# Patient Record
Sex: FEMALE | Race: WHITE | Employment: FULL TIME | URBAN - METROPOLITAN AREA
[De-identification: names, ages, dates, MRNs, and addresses within clinical notes are randomized per-mention and may not be internally consistent; named-entity substitution may affect disease eponyms.]

---

## 2017-02-10 PROCEDURE — 86780 TREPONEMA PALLIDUM: CPT | Performed by: OBSTETRICS & GYNECOLOGY

## 2017-02-10 PROCEDURE — 86850 RBC ANTIBODY SCREEN: CPT | Performed by: OBSTETRICS & GYNECOLOGY

## 2017-02-10 PROCEDURE — 82950 GLUCOSE TEST: CPT | Performed by: OBSTETRICS & GYNECOLOGY

## 2017-04-07 PROCEDURE — 87081 CULTURE SCREEN ONLY: CPT | Performed by: OBSTETRICS & GYNECOLOGY

## 2017-05-02 ENCOUNTER — TELEPHONE (OUTPATIENT)
Dept: OBGYN UNIT | Facility: HOSPITAL | Age: 32
End: 2017-05-02

## 2017-05-03 ENCOUNTER — HOSPITAL ENCOUNTER (INPATIENT)
Facility: HOSPITAL | Age: 32
LOS: 3 days | Discharge: HOME | End: 2017-05-06
Attending: OBSTETRICS & GYNECOLOGY | Admitting: OBSTETRICS & GYNECOLOGY
Payer: COMMERCIAL

## 2017-05-03 ENCOUNTER — SURGERY (OUTPATIENT)
Age: 32
End: 2017-05-03

## 2017-05-03 PROBLEM — Z34.90 PREGNANT: Status: ACTIVE | Noted: 2017-05-03

## 2017-05-03 PROBLEM — Z34.90 PREGNANT (HCC): Status: ACTIVE | Noted: 2017-05-03

## 2017-05-03 PROCEDURE — 59514 CESAREAN DELIVERY ONLY: CPT | Performed by: OBSTETRICS & GYNECOLOGY

## 2017-05-03 RX ORDER — DEXTROSE, SODIUM CHLORIDE, SODIUM LACTATE, POTASSIUM CHLORIDE, AND CALCIUM CHLORIDE 5; .6; .31; .03; .02 G/100ML; G/100ML; G/100ML; G/100ML; G/100ML
INJECTION, SOLUTION INTRAVENOUS AS NEEDED
Status: DISCONTINUED | OUTPATIENT
Start: 2017-05-03 | End: 2017-05-03

## 2017-05-03 RX ORDER — HYDROMORPHONE HYDROCHLORIDE 1 MG/ML
0.4 INJECTION, SOLUTION INTRAMUSCULAR; INTRAVENOUS; SUBCUTANEOUS EVERY 2 HOUR PRN
Status: DISPENSED | OUTPATIENT
Start: 2017-05-03 | End: 2017-05-04

## 2017-05-03 RX ORDER — HYDROCODONE BITARTRATE AND ACETAMINOPHEN 5; 325 MG/1; MG/1
1 TABLET ORAL EVERY 4 HOURS PRN
Status: DISCONTINUED | OUTPATIENT
Start: 2017-05-03 | End: 2017-05-06

## 2017-05-03 RX ORDER — ONDANSETRON 2 MG/ML
4 INJECTION INTRAMUSCULAR; INTRAVENOUS EVERY 6 HOURS PRN
Status: DISCONTINUED | OUTPATIENT
Start: 2017-05-03 | End: 2017-05-06

## 2017-05-03 RX ORDER — TERBUTALINE SULFATE 1 MG/ML
0.25 INJECTION, SOLUTION SUBCUTANEOUS AS NEEDED
Status: DISCONTINUED | OUTPATIENT
Start: 2017-05-03 | End: 2017-05-03

## 2017-05-03 RX ORDER — ONDANSETRON 2 MG/ML
4 INJECTION INTRAMUSCULAR; INTRAVENOUS EVERY 6 HOURS PRN
Status: DISCONTINUED | OUTPATIENT
Start: 2017-05-03 | End: 2017-05-03

## 2017-05-03 RX ORDER — NALBUPHINE HCL 10 MG/ML
2.5 AMPUL (ML) INJECTION
Status: DISCONTINUED | OUTPATIENT
Start: 2017-05-03 | End: 2017-05-06

## 2017-05-03 RX ORDER — SODIUM CHLORIDE, SODIUM LACTATE, POTASSIUM CHLORIDE, CALCIUM CHLORIDE 600; 310; 30; 20 MG/100ML; MG/100ML; MG/100ML; MG/100ML
INJECTION, SOLUTION INTRAVENOUS CONTINUOUS
Status: DISCONTINUED | OUTPATIENT
Start: 2017-05-03 | End: 2017-05-06

## 2017-05-03 RX ORDER — KETOROLAC TROMETHAMINE 30 MG/ML
30 INJECTION, SOLUTION INTRAMUSCULAR; INTRAVENOUS EVERY 6 HOURS PRN
Status: DISPENSED | OUTPATIENT
Start: 2017-05-03 | End: 2017-05-04

## 2017-05-03 RX ORDER — HYDROMORPHONE HYDROCHLORIDE 1 MG/ML
0.4 INJECTION, SOLUTION INTRAMUSCULAR; INTRAVENOUS; SUBCUTANEOUS EVERY 5 MIN PRN
Status: DISCONTINUED | OUTPATIENT
Start: 2017-05-03 | End: 2017-05-04 | Stop reason: HOSPADM

## 2017-05-03 RX ORDER — CEFAZOLIN SODIUM 1 G/3ML
INJECTION, POWDER, FOR SOLUTION INTRAMUSCULAR; INTRAVENOUS
Status: DISCONTINUED | OUTPATIENT
Start: 2017-05-03 | End: 2017-05-03

## 2017-05-03 RX ORDER — BISACODYL 10 MG
10 SUPPOSITORY, RECTAL RECTAL
Status: DISCONTINUED | OUTPATIENT
Start: 2017-05-03 | End: 2017-05-06

## 2017-05-03 RX ORDER — ZOLPIDEM TARTRATE 5 MG/1
5 TABLET ORAL NIGHTLY PRN
Status: DISCONTINUED | OUTPATIENT
Start: 2017-05-03 | End: 2017-05-06

## 2017-05-03 RX ORDER — DIPHENHYDRAMINE HYDROCHLORIDE 50 MG/ML
25 INJECTION INTRAMUSCULAR; INTRAVENOUS ONCE AS NEEDED
Status: ACTIVE | OUTPATIENT
Start: 2017-05-03 | End: 2017-05-03

## 2017-05-03 RX ORDER — SIMETHICONE 80 MG
80 TABLET,CHEWABLE ORAL 3 TIMES DAILY PRN
Status: DISCONTINUED | OUTPATIENT
Start: 2017-05-03 | End: 2017-05-06

## 2017-05-03 RX ORDER — NALBUPHINE HCL 10 MG/ML
2.5 AMPUL (ML) INJECTION EVERY 4 HOURS PRN
Status: DISCONTINUED | OUTPATIENT
Start: 2017-05-03 | End: 2017-05-06

## 2017-05-03 RX ORDER — KETOROLAC TROMETHAMINE 30 MG/ML
30 INJECTION, SOLUTION INTRAMUSCULAR; INTRAVENOUS ONCE AS NEEDED
Status: COMPLETED | OUTPATIENT
Start: 2017-05-03 | End: 2017-05-03

## 2017-05-03 RX ORDER — SODIUM CHLORIDE, SODIUM LACTATE, POTASSIUM CHLORIDE, CALCIUM CHLORIDE 600; 310; 30; 20 MG/100ML; MG/100ML; MG/100ML; MG/100ML
INJECTION, SOLUTION INTRAVENOUS CONTINUOUS
Status: DISCONTINUED | OUTPATIENT
Start: 2017-05-03 | End: 2017-05-03

## 2017-05-03 RX ORDER — KETOROLAC TROMETHAMINE 30 MG/ML
INJECTION, SOLUTION INTRAMUSCULAR; INTRAVENOUS
Status: DISPENSED
Start: 2017-05-03 | End: 2017-05-04

## 2017-05-03 RX ORDER — EPHEDRINE SULFATE 50 MG/ML
5 INJECTION, SOLUTION INTRAVENOUS AS NEEDED
Status: DISCONTINUED | OUTPATIENT
Start: 2017-05-03 | End: 2017-05-03

## 2017-05-03 RX ORDER — ONDANSETRON 2 MG/ML
4 INJECTION INTRAMUSCULAR; INTRAVENOUS ONCE AS NEEDED
Status: ACTIVE | OUTPATIENT
Start: 2017-05-03 | End: 2017-05-03

## 2017-05-03 RX ORDER — IBUPROFEN 600 MG/1
600 TABLET ORAL ONCE AS NEEDED
Status: DISCONTINUED | OUTPATIENT
Start: 2017-05-03 | End: 2017-05-03

## 2017-05-03 RX ORDER — DOCUSATE SODIUM 100 MG/1
100 CAPSULE, LIQUID FILLED ORAL
Status: DISCONTINUED | OUTPATIENT
Start: 2017-05-04 | End: 2017-05-06

## 2017-05-03 RX ORDER — DIPHENHYDRAMINE HCL 25 MG
25 CAPSULE ORAL EVERY 4 HOURS PRN
Status: DISCONTINUED | OUTPATIENT
Start: 2017-05-03 | End: 2017-05-06

## 2017-05-03 RX ORDER — IBUPROFEN 600 MG/1
600 TABLET ORAL EVERY 6 HOURS SCHEDULED
Status: DISCONTINUED | OUTPATIENT
Start: 2017-05-04 | End: 2017-05-06

## 2017-05-03 RX ORDER — DIPHENHYDRAMINE HYDROCHLORIDE 50 MG/ML
12.5 INJECTION INTRAMUSCULAR; INTRAVENOUS EVERY 4 HOURS PRN
Status: DISCONTINUED | OUTPATIENT
Start: 2017-05-03 | End: 2017-05-06

## 2017-05-03 RX ORDER — HYDROCODONE BITARTRATE AND ACETAMINOPHEN 10; 325 MG/1; MG/1
1 TABLET ORAL EVERY 4 HOURS PRN
Status: DISCONTINUED | OUTPATIENT
Start: 2017-05-03 | End: 2017-05-06

## 2017-05-03 RX ORDER — NALOXONE HYDROCHLORIDE 0.4 MG/ML
0.08 INJECTION, SOLUTION INTRAMUSCULAR; INTRAVENOUS; SUBCUTANEOUS
Status: ACTIVE | OUTPATIENT
Start: 2017-05-03 | End: 2017-05-04

## 2017-05-03 RX ORDER — NALBUPHINE HCL 10 MG/ML
2.5 AMPUL (ML) INJECTION
Status: DISCONTINUED | OUTPATIENT
Start: 2017-05-03 | End: 2017-05-04 | Stop reason: HOSPADM

## 2017-05-03 RX ORDER — DEXTROSE, SODIUM CHLORIDE, SODIUM LACTATE, POTASSIUM CHLORIDE, AND CALCIUM CHLORIDE 5; .6; .31; .03; .02 G/100ML; G/100ML; G/100ML; G/100ML; G/100ML
INJECTION, SOLUTION INTRAVENOUS CONTINUOUS
Status: DISCONTINUED | OUTPATIENT
Start: 2017-05-04 | End: 2017-05-06

## 2017-05-03 NOTE — PROGRESS NOTES
Labor Progress Note    Contractions tolerable. On birthing ball.    Temp:  [97.7 °F (36.5 °C)-98.1 °F (36.7 °C)] 98 °F (36.7 °C)  Pulse:  [71-84] 84  Resp:  [18-20] 20  BP: (116-126)/(79-87) 119/79 mmHg  FHT:  baseline 130s, moderate variability, accels natividad

## 2017-05-03 NOTE — PROGRESS NOTES
Pt is a 28year old female admitted to -A. Patient presents with:  R/o Rom: trickle around 0200 and now steady coming out     Pt is  39w5d intra-uterine pregnancy. History obtained, consents signed.  Oriented to room, staff, and plan of car

## 2017-05-03 NOTE — PROGRESS NOTES
[facility]  Labor Progress Note      SUBJECTIVE:    Interval History: none. .    C/o painfurl contractions    OBJECTIVE:    Vital signs in last 24 hours:       Fetal Surveillance:  Fetal heart variability: moderate    Cervix:  Dilation:3cm  Effacement:

## 2017-05-03 NOTE — H&P
[address] and Delivery Prenatal History and Physical Interval Addendum  Please see full Prenatal Record for this pregnancy      SUBJECTIVE:    Interval History:      This is a pregnancy at 44 5/7 weeks admitted for labor    OBJECTIVE:    The pa

## 2017-05-04 PROCEDURE — 3E0234Z INTRODUCTION OF SERUM, TOXOID AND VACCINE INTO MUSCLE, PERCUTANEOUS APPROACH: ICD-10-PCS | Performed by: OBSTETRICS & GYNECOLOGY

## 2017-05-04 NOTE — PROGRESS NOTES
[facility]    Patients Name: Halina Graft  Attending Physician: Jami Garcia MD  CSN: [id number]    Location:  0814/8878-I  MRN: [id number]    YOB: 1985  Admission Date: 5/3/2017     Obstetric Anesthesia Pain Progress Note    Post-Op D

## 2017-05-04 NOTE — OPERATIVE REPORT
PREOPERATIVE DIAGNOSIS:   1. 39 week pregnancy  2. Arrest of dilation in labor    POSTOPERATIVE DIAGNOSIS:   same    PROCEDURE PERFORMED: primary low transverse  section.      SURGEON: Theresa Horner MD    ASSISTANT: Carrie Ortiz MD    ANESTHESIA: hemostatic. 2-0 plain gut used to reapproximate the subcutaneous fat. Skin was closed with 4-0 Vicryl   in a subcuticular fashion. Mastisol and Steri-Strips were applied. All sponge, needle, and instrument counts were correct.  Estimated   blood loss was

## 2017-05-04 NOTE — PLAN OF CARE
GASTROINTESTINAL - ADULT    • Minimal or absence of nausea and vomiting Completed          GASTROINTESTINAL - ADULT    • Maintains or returns to baseline bowel function Progressing    • Maintains adequate nutritional intake (undernourished) Progressing

## 2017-05-04 NOTE — PROGRESS NOTES
At 1830, cx was 5/100/-1  Now same. Also, some caput and cervical edema noted  Pelvis narrow  Discussed possible arrest of dilation. Will reexamine shortly for progress.

## 2017-05-04 NOTE — PROGRESS NOTES
S: pt without complaints.   no flatus  O: VS-Temp:  [97.7 °F (36.5 °C)-99.1 °F (37.3 °C)] 97.7 °F (36.5 °C)  Pulse:  [] 89  Resp:  [11-30] 20  BP: ()/() 116/75 mmHg       I/O last 24 hours-  Intake/Output Summary (Last 24 hours) at 05/04/1

## 2017-05-04 NOTE — PROGRESS NOTES
Comfortable with epidural anesthesia. cx unchanged, edematous. Blood tinged urine in bladder. Caput   Arrest of dilation: will proceed w/. Pt consents.

## 2017-05-04 NOTE — PROGRESS NOTES
Report received from [facility]. Patient resting comfortably in bed. POC discussed with patient. All questions answered. Patient verbalized understanding. Call light within reach.

## 2017-05-04 NOTE — PROGRESS NOTES
Pt transferred  to Mother Baby room 2190 in stable condition. Report given to Missy Mierles [place]. Infant transferred with mother in stable condition.

## 2017-05-05 NOTE — PROGRESS NOTES
S: pt without complaints.   Positive flatus  O: VS-Temp:  [98 °F (36.7 °C)-98.7 °F (37.1 °C)] 98 °F (36.7 °C)  Pulse:  [73-92] 73  Resp:  [16-17] 17  BP: (111-124)/(71-78) 111/78 mmHg       Abdomen: soft, ND, NT  Incision- CDI       Extremities: 1+ edema, N

## 2017-05-05 NOTE — PLAN OF CARE
GASTROINTESTINAL - ADULT    • Maintains or returns to baseline bowel function Completed    • Maintains adequate nutritional intake (undernourished) Completed    • Achieves appropriate nutritional intake (bariatric) Completed          BREAST FEEDING    • Op

## 2017-05-05 NOTE — PLAN OF CARE
Problem: INADEQUATE LATCH, SUCK OR SWALLOW  Goal: Demonstrate ability to latch and sustain latch, audible swallowing and satiety  INTERVENTIONS:  - Assess oral anatomy, notify LIP for abnormal findings  - Hand expression  - Maximize feeding opportunity (sk

## 2017-05-06 VITALS
TEMPERATURE: 98 F | HEIGHT: 64 IN | SYSTOLIC BLOOD PRESSURE: 105 MMHG | HEART RATE: 76 BPM | WEIGHT: 168 LBS | DIASTOLIC BLOOD PRESSURE: 69 MMHG | BODY MASS INDEX: 28.68 KG/M2 | RESPIRATION RATE: 16 BRPM | OXYGEN SATURATION: 100 %

## 2017-05-06 RX ORDER — HYDROCODONE BITARTRATE AND ACETAMINOPHEN 5; 325 MG/1; MG/1
1 TABLET ORAL EVERY 8 HOURS PRN
Qty: 42 TABLET | Refills: 0 | Status: SHIPPED | OUTPATIENT
Start: 2017-05-06 | End: 2017-06-16 | Stop reason: ALTCHOICE

## 2017-05-06 NOTE — DISCHARGE SUMMARY
[facility]  Discharge Summary    Kishan Kee Patient Status:  Inpatient    1985 MRN [id number]   [facility] 2SW-J Attending Ashley Alejandre MD   Hosp Day # 3 PCP Monie Bolanos     Date of Admission: 5/3/2017    Date of Dischar

## 2017-05-06 NOTE — PLAN OF CARE
Problem: POSTPARTUM  Goal: Long Term Goal:Experiences normal postpartum course  INTERVENTIONS:  - Assess and monitor vital signs and lab values. - Assess fundus and lochia. - Provide ice/sitz baths for perineum discomfort.   - Monitor healing of incision/ provide assistance with proper latch. - Review techniques for milk expression (breast pumping) and storage of breast milk. Provide pumping equipment/supplies, instructions and assistance, as needed. - Encourage rooming-in and breast feeding on demand.   - techniques for milk expression (breast pumping) and storage of breast milk. Provide pumping equipment/supplies, instructions and assistance, as needed. - Encourage rooming-in and breast feeding on demand.  - Encourage skin-to-skin contact.   - Provide LC s

## 2017-05-06 NOTE — PROGRESS NOTES
Patient in stable condition. Discharge instructions given. ID bands verified. Hugs and Kisses tags removed. Per infant safety seat to auto by staff carried by father. Mother taken by wheel chair.

## 2017-05-06 NOTE — PROGRESS NOTES
[facility]  Post-Partum Caesarean Section Progress Note    Branden Chen Patient Status:  Inpatient    1985 MRN [id number]   [facility] 2SW-J Attending Mandeep Tim MD   Hosp Day # 3 PCP HEIDI Jose     SUBJECTIVE:    Postp

## 2017-05-08 ENCOUNTER — TELEPHONE (OUTPATIENT)
Dept: OBGYN UNIT | Facility: HOSPITAL | Age: 32
End: 2017-05-08

## 2017-05-09 ENCOUNTER — NURSE ONLY (OUTPATIENT)
Dept: LACTATION | Facility: HOSPITAL | Age: 32
End: 2017-05-09
Attending: OBSTETRICS & GYNECOLOGY
Payer: COMMERCIAL

## 2017-05-09 VITALS — TEMPERATURE: 98 F

## 2017-05-09 DIAGNOSIS — O92.79 ENGORGEMENT OF BREASTS, POSTPARTUM CONDITION OR COMPLICATION: Primary | ICD-10-CM

## 2017-05-09 PROCEDURE — 99213 OFFICE O/P EST LOW 20 MIN: CPT

## 2017-05-09 NOTE — PATIENT INSTRUCTIONS
Today's weight: 7 lb 14.2 oz in diaper only  Intake from breast with nipple shield: 8 mm    Based on observations made at today's visit, the following recommendations are made:    Use the nipple shield as needed to help Jeri Hill latch to the breast. If need uninterested in nursing. Apply cold compresses for 10 minutes on your breast if needed to decrease breast swelling after nursing or pumping:     Rest and drink plenty of fluids.     Check with your doctor about taking ibuprofen for relief of swelling and

## 2017-05-11 ENCOUNTER — TELEPHONE (OUTPATIENT)
Dept: OBGYN UNIT | Facility: HOSPITAL | Age: 32
End: 2017-05-11

## 2017-05-12 ENCOUNTER — TELEPHONE (OUTPATIENT)
Dept: OBGYN UNIT | Facility: HOSPITAL | Age: 32
End: 2017-05-12

## 2017-05-12 NOTE — PROGRESS NOTES
Outgoing cradle call completed. Mom reports that she and infant are doing well. No complaints of PPB or PPD. Has had pediatrician F/U visit. Had appt with LC and has a F/U on Monday. Has PP F/U visit scheduled for OB next Thursday.  Reviewed basic infant a

## 2017-05-15 ENCOUNTER — NURSE ONLY (OUTPATIENT)
Dept: LACTATION | Facility: HOSPITAL | Age: 32
End: 2017-05-15
Attending: OBSTETRICS & GYNECOLOGY
Payer: COMMERCIAL

## 2017-05-15 PROCEDURE — 99212 OFFICE O/P EST SF 10 MIN: CPT

## 2017-05-15 NOTE — PATIENT INSTRUCTIONS
Today's weight: diaper only 3750 grams = 8 lb 4.3 oz. Intake at right breast only: 1st 10-15 minutes 24 ml, went back to breast after diaper change for about 25 minutes for 70 ml  Total feedin ml which is very appropriate for his weight and age.     K

## 2017-06-03 ENCOUNTER — NURSE ONLY (OUTPATIENT)
Dept: LACTATION | Facility: HOSPITAL | Age: 32
End: 2017-06-03
Attending: OBSTETRICS & GYNECOLOGY
Payer: COMMERCIAL

## 2017-06-03 PROCEDURE — 99212 OFFICE O/P EST SF 10 MIN: CPT

## 2017-06-16 PROCEDURE — 88175 CYTOPATH C/V AUTO FLUID REDO: CPT | Performed by: OBSTETRICS & GYNECOLOGY

## 2017-06-16 PROCEDURE — 87624 HPV HI-RISK TYP POOLED RSLT: CPT | Performed by: OBSTETRICS & GYNECOLOGY

## 2018-07-27 PROBLEM — Z34.90 PREGNANT: Status: RESOLVED | Noted: 2017-05-03 | Resolved: 2018-07-27

## 2018-07-27 PROBLEM — O34.219 PREVIOUS CESAREAN DELIVERY AFFECTING PREGNANCY (HCC): Status: ACTIVE | Noted: 2018-07-27

## 2018-07-27 PROBLEM — Z34.80 SUPERVISION OF OTHER NORMAL PREGNANCY, ANTEPARTUM (HCC): Status: ACTIVE | Noted: 2018-07-27

## 2018-07-27 PROBLEM — Z34.90 PREGNANT (HCC): Status: RESOLVED | Noted: 2017-05-03 | Resolved: 2018-07-27

## 2018-07-27 PROBLEM — O34.219 PREVIOUS CESAREAN DELIVERY AFFECTING PREGNANCY: Status: ACTIVE | Noted: 2018-07-27

## 2018-07-27 PROBLEM — Z34.80 SUPERVISION OF OTHER NORMAL PREGNANCY, ANTEPARTUM: Status: ACTIVE | Noted: 2018-07-27

## 2018-07-31 PROCEDURE — 87340 HEPATITIS B SURFACE AG IA: CPT | Performed by: OBSTETRICS & GYNECOLOGY

## 2018-07-31 PROCEDURE — 86762 RUBELLA ANTIBODY: CPT | Performed by: OBSTETRICS & GYNECOLOGY

## 2018-07-31 PROCEDURE — 86850 RBC ANTIBODY SCREEN: CPT | Performed by: OBSTETRICS & GYNECOLOGY

## 2018-07-31 PROCEDURE — 86900 BLOOD TYPING SEROLOGIC ABO: CPT | Performed by: OBSTETRICS & GYNECOLOGY

## 2018-07-31 PROCEDURE — 86780 TREPONEMA PALLIDUM: CPT | Performed by: OBSTETRICS & GYNECOLOGY

## 2018-07-31 PROCEDURE — 87389 HIV-1 AG W/HIV-1&-2 AB AG IA: CPT | Performed by: OBSTETRICS & GYNECOLOGY

## 2018-07-31 PROCEDURE — 86901 BLOOD TYPING SEROLOGIC RH(D): CPT | Performed by: OBSTETRICS & GYNECOLOGY

## 2018-08-15 PROBLEM — O26.899 RH NEGATIVE STATE IN ANTEPARTUM PERIOD: Status: ACTIVE | Noted: 2018-08-15

## 2018-08-15 PROBLEM — Z67.91 RH NEGATIVE STATE IN ANTEPARTUM PERIOD: Status: ACTIVE | Noted: 2018-08-15

## 2018-08-15 PROBLEM — Z67.91 RH NEGATIVE STATE IN ANTEPARTUM PERIOD (HCC): Status: ACTIVE | Noted: 2018-08-15

## 2018-08-15 PROBLEM — O26.899 RH NEGATIVE STATE IN ANTEPARTUM PERIOD (HCC): Status: ACTIVE | Noted: 2018-08-15

## 2018-08-24 PROCEDURE — 87086 URINE CULTURE/COLONY COUNT: CPT | Performed by: OBSTETRICS & GYNECOLOGY

## 2018-12-10 PROCEDURE — 87389 HIV-1 AG W/HIV-1&-2 AB AG IA: CPT | Performed by: OBSTETRICS & GYNECOLOGY

## 2018-12-10 PROCEDURE — 86850 RBC ANTIBODY SCREEN: CPT | Performed by: OBSTETRICS & GYNECOLOGY

## 2018-12-10 PROCEDURE — 86780 TREPONEMA PALLIDUM: CPT | Performed by: OBSTETRICS & GYNECOLOGY

## 2018-12-20 PROBLEM — O24.410 GDM (GESTATIONAL DIABETES MELLITUS), CLASS A1: Status: ACTIVE | Noted: 2018-12-20

## 2018-12-20 PROBLEM — O24.410 GDM (GESTATIONAL DIABETES MELLITUS), CLASS A1 (HCC): Status: ACTIVE | Noted: 2018-12-20

## 2019-02-27 ENCOUNTER — TELEPHONE (OUTPATIENT)
Dept: OBGYN UNIT | Facility: HOSPITAL | Age: 34
End: 2019-02-27

## 2019-03-01 ENCOUNTER — TELEPHONE (OUTPATIENT)
Dept: OBGYN UNIT | Facility: HOSPITAL | Age: 34
End: 2019-03-01

## 2019-03-03 NOTE — H&P
[address] and Delivery Prenatal History and Physical Interval Addendum  Please see full Prenatal Record for this pregnancy      SUBJECTIVE:    Interval History: This is a pregnancy at 39 weeks admitted for repeat  delivery.   [place]

## 2019-03-05 ENCOUNTER — ANESTHESIA (OUTPATIENT)
Dept: OBGYN UNIT | Facility: HOSPITAL | Age: 34
End: 2019-03-05
Payer: COMMERCIAL

## 2019-03-05 ENCOUNTER — ANESTHESIA EVENT (OUTPATIENT)
Dept: OBGYN UNIT | Facility: HOSPITAL | Age: 34
End: 2019-03-05
Payer: COMMERCIAL

## 2019-03-05 ENCOUNTER — HOSPITAL ENCOUNTER (INPATIENT)
Facility: HOSPITAL | Age: 34
LOS: 2 days | Discharge: HOME | End: 2019-03-07
Attending: OBSTETRICS & GYNECOLOGY | Admitting: OBSTETRICS & GYNECOLOGY
Payer: COMMERCIAL

## 2019-03-05 PROBLEM — Z34.90 PREGNANCY (HCC): Status: ACTIVE | Noted: 2019-03-05

## 2019-03-05 PROBLEM — Z34.90 PREGNANCY: Status: ACTIVE | Noted: 2019-03-05

## 2019-03-05 LAB
ANTIBODY SCREEN: POSITIVE
BASOPHILS # BLD AUTO: 0.03 X10(3) UL (ref 0–0.2)
BASOPHILS NFR BLD AUTO: 0.5 %
DEPRECATED RDW RBC AUTO: 46.2 FL (ref 35.1–46.3)
EOSINOPHIL # BLD AUTO: 0.31 X10(3) UL (ref 0–0.7)
EOSINOPHIL NFR BLD AUTO: 4.9 %
ERYTHROCYTE [DISTWIDTH] IN BLOOD BY AUTOMATED COUNT: 13.7 % (ref 11–15)
FETAL SCREEN RESULT: NEGATIVE
GLUCOSE BLD-MCNC: 81 MG/DL (ref 70–99)
HCT VFR BLD AUTO: 35.9 % (ref 35–48)
HGB BLD-MCNC: 12.8 G/DL (ref 12–16)
IMM GRANULOCYTES # BLD AUTO: 0.08 X10(3) UL (ref 0–1)
IMM GRANULOCYTES NFR BLD: 1.3 %
LYMPHOCYTES # BLD AUTO: 1.85 X10(3) UL (ref 1–4)
LYMPHOCYTES NFR BLD AUTO: 29.2 %
MCH RBC QN AUTO: 33.3 PG (ref 26–34)
MCHC RBC AUTO-ENTMCNC: 35.7 G/DL (ref 31–37)
MCV RBC AUTO: 93.5 FL (ref 80–100)
MONOCYTES # BLD AUTO: 0.53 X10(3) UL (ref 0.1–1)
MONOCYTES NFR BLD AUTO: 8.4 %
NEUTROPHILS # BLD AUTO: 3.53 X10 (3) UL (ref 1.5–7.7)
NEUTROPHILS # BLD AUTO: 3.53 X10(3) UL (ref 1.5–7.7)
NEUTROPHILS NFR BLD AUTO: 55.7 %
PLATELET # BLD AUTO: 117 10(3)UL (ref 150–450)
RBC # BLD AUTO: 3.84 X10(6)UL (ref 3.8–5.3)
RH BLOOD TYPE: NEGATIVE
T PALLIDUM AB SER QL IA: NONREACTIVE
WBC # BLD AUTO: 6.3 X10(3) UL (ref 4–11)

## 2019-03-05 PROCEDURE — 85025 COMPLETE CBC W/AUTO DIFF WBC: CPT | Performed by: OBSTETRICS & GYNECOLOGY

## 2019-03-05 PROCEDURE — 85460 HEMOGLOBIN FETAL: CPT | Performed by: OBSTETRICS & GYNECOLOGY

## 2019-03-05 PROCEDURE — 3E0334Z INTRODUCTION OF SERUM, TOXOID AND VACCINE INTO PERIPHERAL VEIN, PERCUTANEOUS APPROACH: ICD-10-PCS | Performed by: OBSTETRICS & GYNECOLOGY

## 2019-03-05 PROCEDURE — 86870 RBC ANTIBODY IDENTIFICATION: CPT | Performed by: OBSTETRICS & GYNECOLOGY

## 2019-03-05 PROCEDURE — 85461 HEMOGLOBIN FETAL: CPT | Performed by: OBSTETRICS & GYNECOLOGY

## 2019-03-05 PROCEDURE — S0028 INJECTION, FAMOTIDINE, 20 MG: HCPCS | Performed by: OBSTETRICS & GYNECOLOGY

## 2019-03-05 PROCEDURE — 86780 TREPONEMA PALLIDUM: CPT | Performed by: OBSTETRICS & GYNECOLOGY

## 2019-03-05 PROCEDURE — 86901 BLOOD TYPING SEROLOGIC RH(D): CPT | Performed by: OBSTETRICS & GYNECOLOGY

## 2019-03-05 PROCEDURE — 82947 ASSAY GLUCOSE BLOOD QUANT: CPT | Performed by: OBSTETRICS & GYNECOLOGY

## 2019-03-05 PROCEDURE — 86900 BLOOD TYPING SEROLOGIC ABO: CPT | Performed by: OBSTETRICS & GYNECOLOGY

## 2019-03-05 PROCEDURE — 86850 RBC ANTIBODY SCREEN: CPT | Performed by: OBSTETRICS & GYNECOLOGY

## 2019-03-05 RX ORDER — HYDROCODONE BITARTRATE AND ACETAMINOPHEN 10; 325 MG/1; MG/1
1 TABLET ORAL EVERY 4 HOURS PRN
Status: DISCONTINUED | OUTPATIENT
Start: 2019-03-05 | End: 2019-03-07

## 2019-03-05 RX ORDER — DOCUSATE SODIUM 100 MG/1
100 CAPSULE, LIQUID FILLED ORAL
Status: DISCONTINUED | OUTPATIENT
Start: 2019-03-06 | End: 2019-03-07

## 2019-03-05 RX ORDER — KETOROLAC TROMETHAMINE 30 MG/ML
INJECTION, SOLUTION INTRAMUSCULAR; INTRAVENOUS
Status: COMPLETED
Start: 2019-03-05 | End: 2019-03-05

## 2019-03-05 RX ORDER — KETOROLAC TROMETHAMINE 30 MG/ML
30 INJECTION, SOLUTION INTRAMUSCULAR; INTRAVENOUS ONCE AS NEEDED
Status: COMPLETED | OUTPATIENT
Start: 2019-03-05 | End: 2019-03-05

## 2019-03-05 RX ORDER — METOCLOPRAMIDE 10 MG/1
10 TABLET ORAL ONCE
Status: DISCONTINUED | OUTPATIENT
Start: 2019-03-05 | End: 2019-03-05 | Stop reason: HOSPADM

## 2019-03-05 RX ORDER — KETOROLAC TROMETHAMINE 30 MG/ML
30 INJECTION, SOLUTION INTRAMUSCULAR; INTRAVENOUS EVERY 6 HOURS PRN
Status: DISPENSED | OUTPATIENT
Start: 2019-03-05 | End: 2019-03-06

## 2019-03-05 RX ORDER — MORPHINE SULFATE 4 MG/ML
2 INJECTION, SOLUTION INTRAMUSCULAR; INTRAVENOUS EVERY 2 HOUR PRN
Status: ACTIVE | OUTPATIENT
Start: 2019-03-05 | End: 2019-03-06

## 2019-03-05 RX ORDER — DIPHENHYDRAMINE HYDROCHLORIDE 50 MG/ML
25 INJECTION INTRAMUSCULAR; INTRAVENOUS ONCE AS NEEDED
Status: DISCONTINUED | OUTPATIENT
Start: 2019-03-05 | End: 2019-03-05 | Stop reason: HOSPADM

## 2019-03-05 RX ORDER — DIPHENHYDRAMINE HYDROCHLORIDE 50 MG/ML
12.5 INJECTION INTRAMUSCULAR; INTRAVENOUS EVERY 4 HOURS PRN
Status: DISCONTINUED | OUTPATIENT
Start: 2019-03-05 | End: 2019-03-07

## 2019-03-05 RX ORDER — MORPHINE SULFATE 0.5 MG/ML
0.2 INJECTION, SOLUTION EPIDURAL; INTRATHECAL; INTRAVENOUS ONCE
Status: DISCONTINUED | OUTPATIENT
Start: 2019-03-05 | End: 2019-03-07

## 2019-03-05 RX ORDER — NALBUPHINE HCL 10 MG/ML
2.5 AMPUL (ML) INJECTION EVERY 4 HOURS PRN
Status: DISCONTINUED | OUTPATIENT
Start: 2019-03-05 | End: 2019-03-07

## 2019-03-05 RX ORDER — DIPHENHYDRAMINE HCL 25 MG
25 CAPSULE ORAL EVERY 4 HOURS PRN
Status: DISCONTINUED | OUTPATIENT
Start: 2019-03-05 | End: 2019-03-07

## 2019-03-05 RX ORDER — ONDANSETRON 2 MG/ML
4 INJECTION INTRAMUSCULAR; INTRAVENOUS EVERY 6 HOURS PRN
Status: DISCONTINUED | OUTPATIENT
Start: 2019-03-05 | End: 2019-03-07

## 2019-03-05 RX ORDER — FAMOTIDINE 20 MG/1
20 TABLET ORAL ONCE
Status: DISCONTINUED | OUTPATIENT
Start: 2019-03-05 | End: 2019-03-05 | Stop reason: HOSPADM

## 2019-03-05 RX ORDER — DEXTROSE MONOHYDRATE 25 G/50ML
50 INJECTION, SOLUTION INTRAVENOUS
Status: DISCONTINUED | OUTPATIENT
Start: 2019-03-05 | End: 2019-03-05 | Stop reason: HOSPADM

## 2019-03-05 RX ORDER — SODIUM CHLORIDE, SODIUM LACTATE, POTASSIUM CHLORIDE, CALCIUM CHLORIDE 600; 310; 30; 20 MG/100ML; MG/100ML; MG/100ML; MG/100ML
INJECTION, SOLUTION INTRAVENOUS CONTINUOUS
Status: DISCONTINUED | OUTPATIENT
Start: 2019-03-05 | End: 2019-03-07

## 2019-03-05 RX ORDER — CEFAZOLIN SODIUM/WATER 2 G/20 ML
SYRINGE (ML) INTRAVENOUS
Status: DISCONTINUED | OUTPATIENT
Start: 2019-03-05 | End: 2019-03-05 | Stop reason: HOSPADM

## 2019-03-05 RX ORDER — IBUPROFEN 600 MG/1
600 TABLET ORAL EVERY 6 HOURS SCHEDULED
Status: DISCONTINUED | OUTPATIENT
Start: 2019-03-06 | End: 2019-03-07

## 2019-03-05 RX ORDER — CEFAZOLIN SODIUM/WATER 2 G/20 ML
2 SYRINGE (ML) INTRAVENOUS ONCE
Status: DISCONTINUED | OUTPATIENT
Start: 2019-03-05 | End: 2019-03-05 | Stop reason: HOSPADM

## 2019-03-05 RX ORDER — BISACODYL 10 MG
10 SUPPOSITORY, RECTAL RECTAL
Status: DISCONTINUED | OUTPATIENT
Start: 2019-03-05 | End: 2019-03-07

## 2019-03-05 RX ORDER — TRISODIUM CITRATE DIHYDRATE AND CITRIC ACID MONOHYDRATE 500; 334 MG/5ML; MG/5ML
30 SOLUTION ORAL ONCE
Status: COMPLETED | OUTPATIENT
Start: 2019-03-05 | End: 2019-03-05

## 2019-03-05 RX ORDER — MORPHINE SULFATE 4 MG/ML
2 INJECTION, SOLUTION INTRAMUSCULAR; INTRAVENOUS EVERY 5 MIN PRN
Status: DISCONTINUED | OUTPATIENT
Start: 2019-03-05 | End: 2019-03-05 | Stop reason: HOSPADM

## 2019-03-05 RX ORDER — ONDANSETRON 2 MG/ML
4 INJECTION INTRAMUSCULAR; INTRAVENOUS ONCE AS NEEDED
Status: DISCONTINUED | OUTPATIENT
Start: 2019-03-05 | End: 2019-03-05 | Stop reason: HOSPADM

## 2019-03-05 RX ORDER — NALBUPHINE HCL 10 MG/ML
2.5 AMPUL (ML) INJECTION
Status: DISCONTINUED | OUTPATIENT
Start: 2019-03-05 | End: 2019-03-05 | Stop reason: HOSPADM

## 2019-03-05 RX ORDER — METOCLOPRAMIDE HYDROCHLORIDE 5 MG/ML
10 INJECTION INTRAMUSCULAR; INTRAVENOUS ONCE
Status: DISCONTINUED | OUTPATIENT
Start: 2019-03-05 | End: 2019-03-05 | Stop reason: HOSPADM

## 2019-03-05 RX ORDER — DEXTROSE, SODIUM CHLORIDE, SODIUM LACTATE, POTASSIUM CHLORIDE, AND CALCIUM CHLORIDE 5; .6; .31; .03; .02 G/100ML; G/100ML; G/100ML; G/100ML; G/100ML
INJECTION, SOLUTION INTRAVENOUS CONTINUOUS
Status: DISCONTINUED | OUTPATIENT
Start: 2019-03-05 | End: 2019-03-07

## 2019-03-05 RX ORDER — ZOLPIDEM TARTRATE 5 MG/1
5 TABLET ORAL NIGHTLY PRN
Status: DISCONTINUED | OUTPATIENT
Start: 2019-03-05 | End: 2019-03-07

## 2019-03-05 RX ORDER — TRISODIUM CITRATE DIHYDRATE AND CITRIC ACID MONOHYDRATE 500; 334 MG/5ML; MG/5ML
30 SOLUTION ORAL ONCE
Status: DISCONTINUED | OUTPATIENT
Start: 2019-03-05 | End: 2019-03-05 | Stop reason: HOSPADM

## 2019-03-05 RX ORDER — HYDROCODONE BITARTRATE AND ACETAMINOPHEN 5; 325 MG/1; MG/1
1 TABLET ORAL EVERY 4 HOURS PRN
Status: DISCONTINUED | OUTPATIENT
Start: 2019-03-05 | End: 2019-03-07

## 2019-03-05 RX ORDER — FAMOTIDINE 10 MG/ML
20 INJECTION, SOLUTION INTRAVENOUS ONCE
Status: DISCONTINUED | OUTPATIENT
Start: 2019-03-05 | End: 2019-03-05 | Stop reason: HOSPADM

## 2019-03-05 RX ORDER — NALOXONE HYDROCHLORIDE 0.4 MG/ML
0.08 INJECTION, SOLUTION INTRAMUSCULAR; INTRAVENOUS; SUBCUTANEOUS
Status: ACTIVE | OUTPATIENT
Start: 2019-03-05 | End: 2019-03-06

## 2019-03-05 RX ORDER — SODIUM CHLORIDE, SODIUM LACTATE, POTASSIUM CHLORIDE, CALCIUM CHLORIDE 600; 310; 30; 20 MG/100ML; MG/100ML; MG/100ML; MG/100ML
INJECTION, SOLUTION INTRAVENOUS CONTINUOUS
Status: DISCONTINUED | OUTPATIENT
Start: 2019-03-05 | End: 2019-03-05 | Stop reason: HOSPADM

## 2019-03-05 RX ORDER — SIMETHICONE 80 MG
80 TABLET,CHEWABLE ORAL 3 TIMES DAILY PRN
Status: DISCONTINUED | OUTPATIENT
Start: 2019-03-05 | End: 2019-03-07

## 2019-03-05 NOTE — ANESTHESIA PREPROCEDURE EVALUATION
PRE-OP EVALUATION    Patient Name: Juaquin Bamberger    Pre-op Diagnosis: * No pre-op diagnosis entered *    Procedure(s):      Surgeon(s) and Role:     * Conchita Soto MD - Primary     * Maryjane Butler MD - Assisting Surgeon    Pre-op vitals reviewed.   Tem Pulmonary    Negative pulmonary ROS. Neuro/Psych    Negative neuro/psych ROS.                                 Past Surgical History:   Procedure Laterality Date   • ANESTH, SECTION  2017   • C-SECT effects of neuroaxial opoids.   Plan/risks discussed with: patient                Present on Admission:  **None**

## 2019-03-05 NOTE — PROGRESS NOTES
Pt is a 29year old female admitted to TRG3/TRG3-A. Patient presents with:  Scheduled : Repeat  Section; Denies LOF Ctx and vb     Pt is  39w4d intra-uterine pregnancy. History obtained, consents signed.  Oriented to room, staff,

## 2019-03-05 NOTE — ANESTHESIA POSTPROCEDURE EVALUATION
[facility]    Sherren Roles Patient Status:  Inpatient   Age/Gender 29year old female MRN [id number]   Location [address] [facility] Attending Cynthia Coto MD   Hosp Day # 0 PCP HEIDI Marie       Anesthesia Post-op Note    Proce

## 2019-03-05 NOTE — PLAN OF CARE
Problem: Patient/Family Goals  Goal: Patient/Family Long Term Goal  Patient's Long Term Goal: Uncomplicated  section    Interventions:  - See additional Care Plan goals for specific interventions   Outcome: Completed Date Met: 19    Goal: Ted Aquino

## 2019-03-05 NOTE — OPERATIVE REPORT
PREOPERATIVE DIAGNOSIS:   1. 39 week pregnancy  2. Prior      POSTOPERATIVE DIAGNOSIS:   same    PROCEDURE PERFORMED: repeat low transverse  section.      SURGEON: Meghana Killian MD    ASSISTANTaMrtha Meeks MD    ANESTHESIA: Spinal.     FINDINGS the  subcutaneous fat. Skin was closed with 4-0 Vicryl   in a subcuticular fashion. Mastisol and Steri-Strips were applied. All sponge, needle, and instrument counts were correct. Estimated   blood loss was 900 ml.  Infant and mother went to the recovery

## 2019-03-05 NOTE — PLAN OF CARE
Problem: Patient/Family Goals  Goal: Patient/Family Long Term Goal  Patient's Long Term Goal: Uncomplicated  section    Interventions:  - See additional Care Plan goals for specific interventions   Outcome: Progressing    Goal: Patient/Family Short

## 2019-03-05 NOTE — PROGRESS NOTES
Report to JAI Vera RN @ this time. POC discussed and enforce. Pt stable in bed with IV infusing and call light in reach.  Verbalized understanding of POC

## 2019-03-06 LAB
BASOPHILS # BLD AUTO: 0.01 X10(3) UL (ref 0–0.2)
BASOPHILS NFR BLD AUTO: 0.1 %
DEPRECATED RDW RBC AUTO: 49.2 FL (ref 35.1–46.3)
EOSINOPHIL # BLD AUTO: 0.11 X10(3) UL (ref 0–0.7)
EOSINOPHIL NFR BLD AUTO: 1.5 %
ERYTHROCYTE [DISTWIDTH] IN BLOOD BY AUTOMATED COUNT: 13.8 % (ref 11–15)
GLUCOSE BLD-MCNC: 83 MG/DL (ref 70–99)
HCT VFR BLD AUTO: 26.9 % (ref 35–48)
HGB BLD-MCNC: 9.1 G/DL (ref 12–16)
IMM GRANULOCYTES # BLD AUTO: 0.04 X10(3) UL (ref 0–1)
IMM GRANULOCYTES NFR BLD: 0.6 %
LYMPHOCYTES # BLD AUTO: 1.21 X10(3) UL (ref 1–4)
LYMPHOCYTES NFR BLD AUTO: 17 %
MCH RBC QN AUTO: 33.1 PG (ref 26–34)
MCHC RBC AUTO-ENTMCNC: 33.8 G/DL (ref 31–37)
MCV RBC AUTO: 97.8 FL (ref 80–100)
MONOCYTES # BLD AUTO: 0.51 X10(3) UL (ref 0.1–1)
MONOCYTES NFR BLD AUTO: 7.2 %
NEUTROPHILS # BLD AUTO: 5.24 X10 (3) UL (ref 1.5–7.7)
NEUTROPHILS # BLD AUTO: 5.24 X10(3) UL (ref 1.5–7.7)
NEUTROPHILS NFR BLD AUTO: 73.6 %
PLATELET # BLD AUTO: 84 10(3)UL (ref 150–450)
RBC # BLD AUTO: 2.75 X10(6)UL (ref 3.8–5.3)
WBC # BLD AUTO: 7.1 X10(3) UL (ref 4–11)

## 2019-03-06 PROCEDURE — 82947 ASSAY GLUCOSE BLOOD QUANT: CPT | Performed by: OBSTETRICS & GYNECOLOGY

## 2019-03-06 PROCEDURE — 85025 COMPLETE CBC W/AUTO DIFF WBC: CPT | Performed by: OBSTETRICS & GYNECOLOGY

## 2019-03-06 NOTE — PROGRESS NOTES
PT ASSISTED UP TO BR. SAT ON TOILET FOR DERICK CARE. MOVING WELL. PT WASHED UP AT SINK. PT SITTING UP IN CHAIR.

## 2019-03-06 NOTE — PROGRESS NOTES
[facility]    Shaunna Fix Patient Status:  Inpatient   Age/Gender 29year old female MRN [id number]   [facility] 2SW-J Attending Shan Moran MD   Hosp Day # 1 PCP Monie aHtfield     Obstetric Anesthesia Pain Progress Note    Po

## 2019-03-06 NOTE — PROGRESS NOTES
Postop Day 1    Pt without complaints.      Temp: 98.7 °F (37.1 °C)  Pulse: 67  Resp: 20  BP: 94/61    Intake/Output Summary (Last 24 hours) at 3/6/2019 0849  Last data filed at 3/6/2019 0823  Gross per 24 hour   Intake 2924 ml   Output 3210 ml   Net -286 m

## 2019-03-07 VITALS
RESPIRATION RATE: 18 BRPM | TEMPERATURE: 99 F | WEIGHT: 153.38 LBS | HEIGHT: 64.02 IN | SYSTOLIC BLOOD PRESSURE: 102 MMHG | BODY MASS INDEX: 26.18 KG/M2 | HEART RATE: 60 BPM | OXYGEN SATURATION: 100 % | DIASTOLIC BLOOD PRESSURE: 63 MMHG

## 2019-03-07 RX ORDER — HYDROCODONE BITARTRATE AND ACETAMINOPHEN 5; 325 MG/1; MG/1
1 TABLET ORAL EVERY 6 HOURS PRN
Qty: 20 TABLET | Refills: 0 | Status: SHIPPED | OUTPATIENT
Start: 2019-03-07 | End: 2019-04-16

## 2019-03-07 NOTE — PROGRESS NOTES
[facility]  Post-Partum Caesarean Section Progress Note    Shana Zhong Patient Status:  Inpatient    1985 MRN [id number]   [facility] 2SW-J Attending Ephraim Mccabe MD   [facility] Day # 2 PCP Monie Salcedo       Subjective:  P

## 2019-03-07 NOTE — DISCHARGE SUMMARY
[facility]  Discharge Summary    Zach Aguirre Patient Status:  Inpatient    1985 MRN [id number]   [facility] 2SW-J Attending Quentin Iglesias MD   [address] Day # 2 PCP Monie Norton     Date of Admission: 3/5/2019    Date of Dis

## 2019-03-08 ENCOUNTER — TELEPHONE (OUTPATIENT)
Dept: LACTATION | Facility: HOSPITAL | Age: 34
End: 2019-03-08

## 2019-03-08 NOTE — TELEPHONE ENCOUNTER
Issues Identified: (actual or potential)  Nipple pain/trauma and Engorgement    Infant Assessment  A[place] is 1days old  Breastfeeding exclusively, every 2-3 hours  Voids 6+ per day, stools, 6+ per day    Infant Growth  Birth weight 7-15  Hospital dischar

## 2019-03-09 ENCOUNTER — TELEPHONE (OUTPATIENT)
Dept: OBGYN UNIT | Facility: HOSPITAL | Age: 34
End: 2019-03-09

## 2019-03-10 ENCOUNTER — NURSE ONLY (OUTPATIENT)
Dept: LACTATION | Facility: HOSPITAL | Age: 34
End: 2019-03-10
Attending: OBSTETRICS & GYNECOLOGY
Payer: COMMERCIAL

## 2019-03-10 VITALS — TEMPERATURE: 99 F

## 2019-03-10 DIAGNOSIS — O92.29 SORE NIPPLES DUE TO LACTATION: ICD-10-CM

## 2019-03-10 PROCEDURE — 99213 OFFICE O/P EST LOW 20 MIN: CPT

## 2019-03-10 NOTE — PATIENT INSTRUCTIONS
Breastfeeding Suggestions for Abundant Milk Supply,Sore Nipples, Possible Reflux    Snuggle your baby in skin to skin contact between and during feedings whenever possible. Massage your breasts before nursing or pumping to soften areola if needed.     Br · If too sore to nurse on one or both breasts, pump one (or both) breast(s) to comfort every 3 hours. If nursing to contentment on one breast, this pumped milk can be stored for future use.  If not nursing on either breast, feed baby your breast milk unti frequency to at least 8-12 times every 24 hours. • Keep pumping log with 24 hour collection totals to monitor milk supply.   • Once your milk is in (3-5 days post-delivery), pump until the sprays of milk slow to drops and for 1-2 minutes after to obtain Then decrease speed once milk begins to flow again. • Pump after you’ve seen, held, or touched your baby. Discuss “kangaroo care” with your baby’s nurse – holding your baby skin-to-skin on your chest when your baby is able.   • Pump with baby close by or baby’s nurse for arrangements to be made. 3/10/19 Lactation Outpatient visit - Lee weight today = 7 lb 15 oz naked weight. Expectation is for a  to be back to birth weight by 3weeks old, then gain 1/2 - 1 oz daily (4-7 oz weekly).   Roseline

## 2019-04-15 PROBLEM — Z34.90 PREGNANCY (HCC): Status: RESOLVED | Noted: 2019-03-05 | Resolved: 2019-04-15

## 2019-04-15 PROBLEM — Z34.80 SUPERVISION OF OTHER NORMAL PREGNANCY, ANTEPARTUM: Status: RESOLVED | Noted: 2018-07-27 | Resolved: 2019-04-15

## 2019-04-15 PROBLEM — O34.219 PREVIOUS CESAREAN DELIVERY AFFECTING PREGNANCY (HCC): Status: RESOLVED | Noted: 2018-07-27 | Resolved: 2019-04-15

## 2019-04-15 PROBLEM — Z67.91 RH NEGATIVE STATE IN ANTEPARTUM PERIOD: Status: RESOLVED | Noted: 2018-08-15 | Resolved: 2019-04-15

## 2019-04-15 PROBLEM — O26.899 RH NEGATIVE STATE IN ANTEPARTUM PERIOD (HCC): Status: RESOLVED | Noted: 2018-08-15 | Resolved: 2019-04-15

## 2019-04-15 PROBLEM — Z34.90 PREGNANCY: Status: RESOLVED | Noted: 2019-03-05 | Resolved: 2019-04-15

## 2019-04-15 PROBLEM — O26.899 RH NEGATIVE STATE IN ANTEPARTUM PERIOD: Status: RESOLVED | Noted: 2018-08-15 | Resolved: 2019-04-15

## 2019-04-15 PROBLEM — Z67.91 RH NEGATIVE STATE IN ANTEPARTUM PERIOD (HCC): Status: RESOLVED | Noted: 2018-08-15 | Resolved: 2019-04-15

## 2019-04-15 PROBLEM — Z34.80 SUPERVISION OF OTHER NORMAL PREGNANCY, ANTEPARTUM (HCC): Status: RESOLVED | Noted: 2018-07-27 | Resolved: 2019-04-15

## 2019-04-15 PROBLEM — O34.219 PREVIOUS CESAREAN DELIVERY AFFECTING PREGNANCY: Status: RESOLVED | Noted: 2018-07-27 | Resolved: 2019-04-15

## 2021-07-15 NOTE — PLAN OF CARE
ANXIETY    • Will report anxiety at manageable levels Progressing        BIRTH - VAGINAL/ SECTION    • Fetal and maternal status remain reassuring during the birth process Progressing        PAIN - ADULT    • Verbalizes/displays adequate comfort le Elvira Nguyen(Attending)

## 2021-12-22 PROBLEM — O24.410 GDM (GESTATIONAL DIABETES MELLITUS), CLASS A1: Status: RESOLVED | Noted: 2018-12-20 | Resolved: 2021-12-22

## 2021-12-22 PROBLEM — O24.410 GDM (GESTATIONAL DIABETES MELLITUS), CLASS A1 (HCC): Status: RESOLVED | Noted: 2018-12-20 | Resolved: 2021-12-22

## 2021-12-22 PROBLEM — Z86.32 HISTORY OF GESTATIONAL DIABETES: Status: ACTIVE | Noted: 2021-12-22

## 2023-03-03 ENCOUNTER — LAB REQUISITION (OUTPATIENT)
Dept: LAB | Facility: HOSPITAL | Age: 38
End: 2023-03-03
Payer: COMMERCIAL

## 2023-03-03 DIAGNOSIS — M67.431 GANGLION, RIGHT WRIST: ICD-10-CM

## 2023-03-03 PROCEDURE — 88304 TISSUE EXAM BY PATHOLOGIST: CPT | Performed by: ORTHOPAEDIC SURGERY

## 2025-02-17 ENCOUNTER — HOSPITAL ENCOUNTER (EMERGENCY)
Facility: HOSPITAL | Age: 40
Discharge: HOME | End: 2025-02-17
Attending: EMERGENCY MEDICINE
Payer: COMMERCIAL

## 2025-02-17 ENCOUNTER — APPOINTMENT (OUTPATIENT)
Dept: CT IMAGING | Facility: HOSPITAL | Age: 40
End: 2025-02-17
Attending: EMERGENCY MEDICINE
Payer: COMMERCIAL

## 2025-02-17 VITALS
HEART RATE: 73 BPM | OXYGEN SATURATION: 99 % | DIASTOLIC BLOOD PRESSURE: 73 MMHG | BODY MASS INDEX: 22.2 KG/M2 | WEIGHT: 130 LBS | SYSTOLIC BLOOD PRESSURE: 103 MMHG | RESPIRATION RATE: 16 BRPM | TEMPERATURE: 98 F | HEIGHT: 64 IN

## 2025-02-17 DIAGNOSIS — R10.9 ABDOMINAL PAIN OF UNKNOWN ETIOLOGY: Primary | ICD-10-CM

## 2025-02-17 DIAGNOSIS — K52.9 COLITIS: ICD-10-CM

## 2025-02-17 LAB
ALBUMIN SERPL-MCNC: 4.6 G/DL (ref 3.2–4.8)
ALBUMIN/GLOB SERPL: 1.8 {RATIO} (ref 1–2)
ALP LIVER SERPL-CCNC: 52 U/L
ALT SERPL-CCNC: 7 U/L
ANION GAP SERPL CALC-SCNC: 9 MMOL/L (ref 0–18)
AST SERPL-CCNC: 17 U/L (ref ?–34)
B-HCG UR QL: NEGATIVE
BASOPHILS # BLD AUTO: 0.03 X10(3) UL (ref 0–0.2)
BASOPHILS NFR BLD AUTO: 0.3 %
BILIRUB SERPL-MCNC: 0.5 MG/DL (ref 0.3–1.2)
BILIRUB UR QL STRIP.AUTO: NEGATIVE
BUN BLD-MCNC: 9 MG/DL (ref 9–23)
CALCIUM BLD-MCNC: 9.1 MG/DL (ref 8.7–10.6)
CHLORIDE SERPL-SCNC: 102 MMOL/L (ref 98–112)
CLARITY UR REFRACT.AUTO: CLEAR
CO2 SERPL-SCNC: 27 MMOL/L (ref 21–32)
CREAT BLD-MCNC: 0.81 MG/DL
EGFRCR SERPLBLD CKD-EPI 2021: 95 ML/MIN/1.73M2 (ref 60–?)
EOSINOPHIL # BLD AUTO: 0.2 X10(3) UL (ref 0–0.7)
EOSINOPHIL NFR BLD AUTO: 1.9 %
ERYTHROCYTE [DISTWIDTH] IN BLOOD BY AUTOMATED COUNT: 13.1 %
GLOBULIN PLAS-MCNC: 2.6 G/DL (ref 2–3.5)
GLUCOSE BLD-MCNC: 92 MG/DL (ref 70–99)
GLUCOSE UR STRIP.AUTO-MCNC: NORMAL MG/DL
HCT VFR BLD AUTO: 38.1 %
HGB BLD-MCNC: 13.5 G/DL
IMM GRANULOCYTES # BLD AUTO: 0.04 X10(3) UL (ref 0–1)
IMM GRANULOCYTES NFR BLD: 0.4 %
LEUKOCYTE ESTERASE UR QL STRIP.AUTO: NEGATIVE
LIPASE SERPL-CCNC: 40 U/L (ref 12–53)
LYMPHOCYTES # BLD AUTO: 1.36 X10(3) UL (ref 1–4)
LYMPHOCYTES NFR BLD AUTO: 12.7 %
MCH RBC QN AUTO: 32 PG (ref 26–34)
MCHC RBC AUTO-ENTMCNC: 35.4 G/DL (ref 31–37)
MCV RBC AUTO: 90.3 FL
MONOCYTES # BLD AUTO: 0.81 X10(3) UL (ref 0.1–1)
MONOCYTES NFR BLD AUTO: 7.6 %
NEUTROPHILS # BLD AUTO: 8.23 X10 (3) UL (ref 1.5–7.7)
NEUTROPHILS # BLD AUTO: 8.23 X10(3) UL (ref 1.5–7.7)
NEUTROPHILS NFR BLD AUTO: 77.1 %
NITRITE UR QL STRIP.AUTO: NEGATIVE
OSMOLALITY SERPL CALC.SUM OF ELEC: 284 MOSM/KG (ref 275–295)
PH UR STRIP.AUTO: 5.5 [PH] (ref 5–8)
PLATELET # BLD AUTO: 189 10(3)UL (ref 150–450)
POTASSIUM SERPL-SCNC: 3.8 MMOL/L (ref 3.5–5.1)
PROT SERPL-MCNC: 7.2 G/DL (ref 5.7–8.2)
PROT UR STRIP.AUTO-MCNC: NEGATIVE MG/DL
RBC # BLD AUTO: 4.22 X10(6)UL
RBC UR QL AUTO: NEGATIVE
SODIUM SERPL-SCNC: 138 MMOL/L (ref 136–145)
SP GR UR STRIP.AUTO: 1.01 (ref 1–1.03)
UROBILINOGEN UR STRIP.AUTO-MCNC: NORMAL MG/DL
WBC # BLD AUTO: 10.7 X10(3) UL (ref 4–11)

## 2025-02-17 PROCEDURE — 99284 EMERGENCY DEPT VISIT MOD MDM: CPT

## 2025-02-17 PROCEDURE — 81003 URINALYSIS AUTO W/O SCOPE: CPT

## 2025-02-17 PROCEDURE — 96361 HYDRATE IV INFUSION ADD-ON: CPT

## 2025-02-17 PROCEDURE — 81003 URINALYSIS AUTO W/O SCOPE: CPT | Performed by: EMERGENCY MEDICINE

## 2025-02-17 PROCEDURE — 81025 URINE PREGNANCY TEST: CPT

## 2025-02-17 PROCEDURE — 83690 ASSAY OF LIPASE: CPT

## 2025-02-17 PROCEDURE — 74177 CT ABD & PELVIS W/CONTRAST: CPT | Performed by: EMERGENCY MEDICINE

## 2025-02-17 PROCEDURE — 96374 THER/PROPH/DIAG INJ IV PUSH: CPT

## 2025-02-17 PROCEDURE — 99285 EMERGENCY DEPT VISIT HI MDM: CPT

## 2025-02-17 PROCEDURE — 96375 TX/PRO/DX INJ NEW DRUG ADDON: CPT

## 2025-02-17 PROCEDURE — 80053 COMPREHEN METABOLIC PANEL: CPT | Performed by: EMERGENCY MEDICINE

## 2025-02-17 PROCEDURE — 85025 COMPLETE CBC W/AUTO DIFF WBC: CPT | Performed by: EMERGENCY MEDICINE

## 2025-02-17 PROCEDURE — 85025 COMPLETE CBC W/AUTO DIFF WBC: CPT

## 2025-02-17 PROCEDURE — 83690 ASSAY OF LIPASE: CPT | Performed by: EMERGENCY MEDICINE

## 2025-02-17 PROCEDURE — 80053 COMPREHEN METABOLIC PANEL: CPT

## 2025-02-17 RX ORDER — ONDANSETRON 4 MG/1
4 TABLET, ORALLY DISINTEGRATING ORAL EVERY 4 HOURS PRN
Qty: 10 TABLET | Refills: 0 | Status: SHIPPED | OUTPATIENT
Start: 2025-02-17 | End: 2025-02-24

## 2025-02-17 RX ORDER — CIPROFLOXACIN 500 MG/1
500 TABLET, FILM COATED ORAL 2 TIMES DAILY
Qty: 10 TABLET | Refills: 0 | Status: SHIPPED | OUTPATIENT
Start: 2025-02-17 | End: 2025-02-22

## 2025-02-17 RX ORDER — MORPHINE SULFATE 4 MG/ML
4 INJECTION, SOLUTION INTRAMUSCULAR; INTRAVENOUS EVERY 30 MIN PRN
Status: DISCONTINUED | OUTPATIENT
Start: 2025-02-17 | End: 2025-02-17

## 2025-02-17 RX ORDER — ONDANSETRON 2 MG/ML
4 INJECTION INTRAMUSCULAR; INTRAVENOUS ONCE
Status: COMPLETED | OUTPATIENT
Start: 2025-02-17 | End: 2025-02-17

## 2025-02-17 RX ORDER — TRAMADOL HYDROCHLORIDE 50 MG/1
TABLET ORAL EVERY 6 HOURS PRN
Qty: 10 TABLET | Refills: 0 | Status: SHIPPED | OUTPATIENT
Start: 2025-02-17 | End: 2025-02-22

## 2025-02-17 RX ORDER — METRONIDAZOLE 500 MG/1
500 TABLET ORAL 3 TIMES DAILY
Qty: 15 TABLET | Refills: 0 | Status: SHIPPED | OUTPATIENT
Start: 2025-02-17 | End: 2025-02-22

## 2025-02-17 NOTE — ED PROVIDER NOTES
Patient Seen in: [facility] Emergency Department      History     Chief Complaint   Patient presents with    Abdomen/Flank Pain     Stated Complaint: right lower abd pain    Subjective:   HPI      This is a 39-year-old female who states that she started having symptoms last night.  She has had nausea right-sided abdominal pain right mid right upper.  Describes the pain is moderate to severe.  She has had no fever no vomiting no dysuria no diarrhea.  No diarrhea.  She has had a  but no other significant abdominal history.  Pain is moderate to severe.    Objective:     Past Medical History:    Anovulation    Anxiety state, unspecified    no current treatment    Depression    Gestational diabetes (HCC)    Infertility, female              Past Surgical History:   Procedure Laterality Date    Anesth, section  2017      5-3-17    Dilation/curettage,diagnostic      suction    Tonsillectomy      age 4                Social History     Socioeconomic History    Marital status:    Tobacco Use    Smoking status: Never    Smokeless tobacco: Never   Substance and Sexual Activity    Alcohol use: No     Alcohol/week: 0.0 standard drinks of alcohol    Drug use: No    Sexual activity: Not Currently     Partners: Male                  Physical Exam     ED Triage Vitals   BP 25 1413 113/78   Pulse 25 1413 92   Resp 25 1413 18   Temp 25 1413 98.3 °F (36.8 °C)   Temp src --    SpO2 25 1413 99 %   O2 Device 25 1834 None (Room air)       Current Vitals:   Vital Signs  BP: 103/73  Pulse: 73  Resp: 16  Temp: 98.3 °F (36.8 °C)    Oxygen Therapy  SpO2: 99 %  O2 Device: None (Room air)        Physical Exam     General: .  Female no respiratory distress.  The patient is in no respiratory distress    HEENT: Atraumatic, conjunctiva are not pale.  There is no icterus.  Oral mucosa Is wet.  No facial trauma.  The neck is supple.    LUNGS: Clear to auscultation, there is  no wheezing or retraction.  No crackles.    CV: Cardiovascular is regular without murmurs or rubs.    ABD: The abdomen is soft nondistended moderately tender over the right mid abdomen, right upper and slightly over the right lower.  No left lower quadrant tenderness.  No CVA tenderness she looks moderately uncomfortable secondary to pain there is no rebound.  There is no guarding.    EXT: There is good pulses bilaterally.  There is no calf tenderness.  There is no rash noted.  There is no edema    NEURO: Alert and oriented x4.  Muscle strength and sensory exam is grossly normal.  And the patient is neurologically intact with no focal findings.    ED Course     Labs Reviewed   COMP METABOLIC PANEL (14) - Abnormal; Notable for the following components:       Result Value    ALT 7 (*)     All other components within normal limits   CBC WITH DIFFERENTIAL WITH PLATELET - Abnormal; Notable for the following components:    Neutrophil Absolute Prelim 8.23 (*)     Neutrophil Absolute 8.23 (*)     All other components within normal limits   URINALYSIS, ROUTINE - Abnormal; Notable for the following components:    Ketones Urine Trace (*)     All other components within normal limits   LIPASE - Normal   POCT PREGNANCY URINE - Normal            The patient was placed on monitors, IV was started, blood was drawn.  Workup was done to rule out pancreatitis obstruction perforation appendicitis was also considered.     MDM      Urinalysis did show trace ketones, CBC was grossly normal comprehensive is normal pregnancy was negative.  CT was done.  Urinary tract infection white count was grossly normal.    I personally reviewed the radiographs and my individual interpretation shows    No obstruction no perforation small sized right-sided ovarian cyst.    Also reviewed official report and it shows    CT ABDOMEN+PELVIS(CONTRAST ONLY)(CPT=74177)    Result Date: 2/17/2025  PROCEDURE:  CT ABDOMEN+PELVIS (CONTRAST ONLY) (CPT=74177)   COMPARISON:  None.  INDICATIONS:  right lower abd pain  TECHNIQUE:  CT scanning was performed from the dome of the diaphragm to the pubic symphysis with non-ionic intravenous contrast material. Post contrast coronal MPR imaging was performed.  Dose reduction techniques were used. Dose information is transmitted to the ACR (American College of Radiology) NRDR (National Radiology Data Registry) which includes the Dose Index Registry.  PATIENT STATED HISTORY:(As transcribed by Technologist)  right lower abdomen pain.    CONTRAST USED:  80cc of Isovue 370  FINDINGS:  LUNG BASE:  Atelectasis. LIVER:  Homogeneous enhancement. BILIARY:  No biliary ductal dilatation. PANCREAS:  Homogeneous enhancement. SPLEEN:  Normal caliber. KIDNEYS:  No hydronephrosis or focal renal mass. ADRENALS:  Normal. AORTA/VASCULAR:  No aneurysm. RETROPERITONEUM:  No enlarged adenopathy. BOWEL/MESENTERY:  Normal caliber appendix.  Marked wall thickening with edematous changes and pericolonic inflammatory stranding involving the right side of the colon and proximal transverse colon consistent with colitis.  There is a trace amount of adjacent free fluid in the right pericolic gutter.  Infectious, inflammatory as well as ischemic etiology should be considered.  There is a large amount of stool in the sigmoid colon and descending colon.  No free air.  No dilated small bowel.  ABDOMINAL WALL:  No ventral wall hernia. PELVIC ORGANS:  There is a small amount of free fluid in the cul de sac.  1.9 cm right ovarian cyst.  BONES:  Mild degenerative changes in the lower lumbar facets.             CONCLUSION:  Left-sided colitis with marked wall thickening with edematous changes involving the ascending and proximal transverse colon with some adjacent free fluid.  No free air.  Differential includes infectious, inflammatory as well as ischemic etiologies.  The appendix is visualized and normal in caliber.  There is of 1.9 cm right ovarian cyst with a small  amount of free fluid in the cul de sac.   LOCATION:  [id number]   Dictated by (CST): Mirian Gardner MD on 2/17/2025 at 5:38 PM     Finalized by (CST): Mirian Gardner MD on 2/17/2025 at 5:42 PM        The patient given was given IV fluids, Zofran and 1 dose of morphine.  I did go back and reexamined her abdomen is minimally tender in the right mid abdomen.  No rebound or guarding the patient's pregnancy test was negative comprehensive is normal lipase, urinalysis was grossly negative CBC was normal.  I discussed with her the CT does show what seems to be colitis.      .  I discussed this case with Dr. Ervin from [facility].  He is I did he did review the CT findings I discussed him there is a fair amount of colitis with some wall thickening and edematous changes involving the ascending proximal transverse colon.  No free air.  I discussed this case with the.  The patient he discussed that patient can go home.  With close follow-up she was unable to give us a stool some she said she had some loose stools but it was not really diarrhea.  She has not traveled outside the country she not been on recent antibiotics.  She has had no vomiting no fevers.  She has had the symptoms starting this weekend.  I discussed with her options including admission.  She wants to go home I discussed if he is going to go home I would because of the amount of inflammation I did recommend that she take clear liquids and I recommend that she start antibiotics after the she gets a stool sample I did write her stool sample kit.  And I discussed with her that she can soon as she gets a stool sample she can start antibiotics.  I discussed with clear liquids discussed with her that if she has increasing pain discomfort fevers or chills to return immediately to the emergency room I did reexamine her there is no rebound no guarding she feels comfortable going home.  Discussed importance of close follow-up and return if increasing pain discomfort.  I  discussed that most likely it does look like colitis I did discuss that she does have an incidental cyst of ovarian cyst that needs a follow-up with her primary care physician and she may need repeat ultrasound but this is now probably causing her pain the pain is probably related to the colitis as the cyst is very small.  I discussed importance of close follow-up and return if increasing pain she wants to go and she does not want to be admitted I discussed with her anytime if there is any increasing pain or fever to return immediately to the emergent as there is a fair amount of colitis noted.  On the CT.  I discussed importance of close follow-up with both her primary care physician and GI consultation and to return if increasing pain discomfort.    Medical Decision Making      Disposition and Plan     Clinical Impression:  1. Abdominal pain of unknown etiology    2. Colitis         Disposition:  Discharge  2/17/2025  6:51 pm    Follow-up:  Twan Arroyo MD  [address]  [facility] [address]  [phone]    Follow up in 2 day(s)      Kendal Marrero MD  [address]  [address]  [facility] [address]  [phone]    Follow up in 2 day(s)      Ty Ervin MD  [address]  [address]  [facility] [address]  [phone]    Follow up in 2 day(s)            Medications Prescribed:  Discharge Medication List as of 2/17/2025  6:55 PM        START taking these medications    Details   ondansetron 4 MG Oral Tablet Dispersible Take 1 tablet (4 mg total) by mouth every 4 (four) hours as needed for Nausea., Normal, Disp-10 tablet, R-0      traMADol 50 MG Oral Tab Take 1-2 tablets ( mg total) by mouth every 6 (six) hours as needed for Pain., Print, Disp-10 tablet, R-0      ciprofloxacin 500 MG Oral Tab Take 1 tablet (500 mg total) by mouth 2 (two) times daily for 5 days., Normal, Disp-10 tablet, R-0      metroNIDAZOLE 500 MG Oral Tab Take 1 tablet (500 mg total) by mouth 3 (three)  times daily for 5 days., Normal, Disp-15 tablet, R-0                 Supplementary Documentation:

## 2025-02-18 ENCOUNTER — LAB ENCOUNTER (OUTPATIENT)
Dept: LAB | Facility: HOSPITAL | Age: 40
End: 2025-02-18
Attending: EMERGENCY MEDICINE
Payer: COMMERCIAL

## 2025-02-18 DIAGNOSIS — K52.9 COLITIS: ICD-10-CM

## 2025-02-18 DIAGNOSIS — R10.9 ABDOMINAL PAIN OF UNKNOWN ETIOLOGY: ICD-10-CM

## 2025-02-18 PROCEDURE — 87046 STOOL CULTR AEROBIC BACT EA: CPT

## 2025-02-18 PROCEDURE — 87493 C DIFF AMPLIFIED PROBE: CPT

## 2025-02-18 PROCEDURE — 87045 FECES CULTURE AEROBIC BACT: CPT

## 2025-02-18 PROCEDURE — 87427 SHIGA-LIKE TOXIN AG IA: CPT

## 2025-02-18 PROCEDURE — 87015 SPECIMEN INFECT AGNT CONCNTJ: CPT

## 2025-02-18 NOTE — DISCHARGE INSTRUCTIONS
Follow-up with your primary care physician return if increasing pain follow-up with GI.  Clear liquids slowly advance she did hit.  Follow-up with GI.  Get the stool sample first before starting the antibiotics.  Start antibiotics after getting a stool sample follow-up with both your primary, GI specialist if you have increasing pain discomfort or fever you need to return to the emergency room    You were seen in the emergency room in a limited time.  There is a possibility that although we do not see any acute process at this present time that things can change with time.  Is therefore imperative that you follow-up with primary care physician for close follow-up.  If there is any significant progression of your pain  or other symptoms you to return immediately to the emergency room.

## 2025-02-19 LAB — C DIFF TOX B STL QL: NEGATIVE

## (undated) NOTE — LETTER
[facility]  [id number] [facility], [address] [place] [address]    Consent for Operation    Date: _____5. 3.2017_____________    Time: _______________    1.  I authorize the performance upon Talia Rodríguez the following operation:                              [employer] videotape. [facility] will not be responsible for storage or maintenance of this tape. 6. For the purpose of advancing medical education, I consent to the admittance of observers to the Operating Room.     7. I authorize the use of any specimen, organs Signature of Patient:   ___________________________    When the patient is a minor or mentally incompetent to give consent:  Signature of person authorized to consent for patient: ___________________________   Relationship to patient: _____________________ before. 3. I understand how the anesthesia medicine will help me (benefits). 4. I understand that with any type of anesthesia medicine there are risks:  a.  The most common risks are: nausea, vomiting, sore throat, muscle soreness, damage to my eyes, _____________________________________________________________________________  Witness        Date   Time  I have verified that the signature is that of the patient or patient’s representative, and that it was signed before the procedure    Page 2 of 2

## (undated) NOTE — LETTER
[facility]  Home [address] [facility], [address] [place] [address]    Consent for Operation    Date: __________________    Time: _______________    1.  I authorize the performance upon Talia Rodríguez the following operation:                              [employer] videotape. [facility] will not be responsible for storage or maintenance of this tape. 6. For the purpose of advancing medical education, I consent to the admittance of observers to the Operating Room.     7. I authorize the use of any specimen, organs Signature of Patient:   ___________________________    When the patient is a minor or mentally incompetent to give consent:  Signature of person authorized to consent for patient: ___________________________   Relationship to patient: _____________________ 3. I understand how the anesthesia medicine will help me (benefits). 4. I understand that with any type of anesthesia medicine there are risks:  a.  The most common risks are: nausea, vomiting, sore throat, muscle soreness, damage to my eyes, mouth, or t _____________________________________________________________________________  Witness        Date   Time  I have verified that the signature is that of the patient or patient’s representative, and that it was signed before the procedure    Page 2 of 2

## (undated) NOTE — MR AVS SNAPSHOT
[facility]  [address] [facility] [facility] [address]  [phone]               Thank you for choosing us for your health care visit with Frances Mota.   We are glad to serve you and happy to provide you with this summary This list is accurate as of: 5/15/17 11:58 AM.  Always use your most recent med list.                Breast Pump Misc   Double electric breast pump. Diagnosis is breastfeeding status of mother.            CLARITIN 10 MG Caps   Generic drug:  Loratadine   Ta

## (undated) NOTE — IP AVS SNAPSHOT
[facility] [place] [address] [place], [address] ~ [phone]                Discharge Summary   5/3/2017    Mansi Keenan           Admission Information        Provider Department    5/3/2017 Lizzy Irwin MD  [id number]      Why you Motrin 600 mg every 6 hours as needed for pain. Stool softener twice a day as needed. Preeclampsia/Hypertension       Preeclampsia is a serious disease related to high blood pressure (hypertension).   Preeclampsia/hypertension can happen during pregn Recent Hematology Lab Results (cont.)  (Last 3 results in the past 90 days)    Neutrophil % Lymphocyte % Monocyte % Eosinophil % Basophil % Prelim Neut Abs Final Neut Abs Lymphocyte Abso Monocyte Absolu Eosinophil Abso Basophil Absolu    (05/04/17)  81.9 ( Breast massage / compression / manual expression techniques Resolved   Breast changes and physiology of lactation Resolved   Position and deep latch techniques Resolved   Pumping guidelines - if applicable Resolved   Encourage to [employer] lactation assistanc Safety Resolved   Review Plan of Care Resolved   Postpartum Procedures Resolved   Postpartum Medications/Pain Management Resolved   Postpartum Self Care Resolved   Postpartum Psychosocial and Spiritual Support Resolved   Postpartum Discharge Resolved   Com - If you don’t have insurance, Dominik Hogna has partnered with [employer] to help you get signed up for insurance coverage.   [employer] is a Federal Navigator program that can help with your Affordable Care Act cover

## (undated) NOTE — MR AVS SNAPSHOT
[facility]  [address] [facility] [facility] [address]  [phone]               Thank you for choosing us for your health care visit with [address] Rod[address] Dickinson.   We are glad to serve you and happy to provide you with this summary · Express drops of milk onto your baby's lips to encourage licking. · Point your nipple to baby's nose  · Stroke nipple lightly down center of lips  · Wait for wide mouth with tongue cupped at bottom of mouth.   · Chin should be deep into breast, with some Breast Pump Misc   Double electric breast pump. Diagnosis is breastfeeding status of mother. CLARITIN 10 MG Caps   Generic drug:  Loratadine   Take by mouth.            HYDROcodone-acetaminophen 5-325 MG Tabs   Take 1 tablet by mouth every 8 (eig